# Patient Record
Sex: MALE | Race: WHITE | NOT HISPANIC OR LATINO
[De-identification: names, ages, dates, MRNs, and addresses within clinical notes are randomized per-mention and may not be internally consistent; named-entity substitution may affect disease eponyms.]

---

## 2017-02-07 PROBLEM — Z00.00 ENCOUNTER FOR PREVENTIVE HEALTH EXAMINATION: Status: ACTIVE | Noted: 2017-02-07

## 2017-02-21 ENCOUNTER — APPOINTMENT (OUTPATIENT)
Dept: DERMATOLOGY | Facility: CLINIC | Age: 37
End: 2017-02-21

## 2017-07-18 ENCOUNTER — APPOINTMENT (OUTPATIENT)
Dept: DERMATOLOGY | Facility: CLINIC | Age: 37
End: 2017-07-18

## 2017-11-13 ENCOUNTER — TRANSCRIPTION ENCOUNTER (OUTPATIENT)
Age: 37
End: 2017-11-13

## 2018-02-13 ENCOUNTER — APPOINTMENT (OUTPATIENT)
Dept: DERMATOLOGY | Facility: CLINIC | Age: 38
End: 2018-02-13
Payer: COMMERCIAL

## 2018-02-13 PROCEDURE — 99214 OFFICE O/P EST MOD 30 MIN: CPT

## 2019-02-12 ENCOUNTER — APPOINTMENT (OUTPATIENT)
Dept: DERMATOLOGY | Facility: CLINIC | Age: 39
End: 2019-02-12

## 2019-04-05 ENCOUNTER — APPOINTMENT (OUTPATIENT)
Dept: DERMATOLOGY | Facility: CLINIC | Age: 39
End: 2019-04-05
Payer: COMMERCIAL

## 2019-04-05 PROCEDURE — 99214 OFFICE O/P EST MOD 30 MIN: CPT

## 2023-01-05 ENCOUNTER — EMERGENCY (EMERGENCY)
Facility: HOSPITAL | Age: 43
LOS: 1 days | Discharge: HOME | End: 2023-01-05
Attending: STUDENT IN AN ORGANIZED HEALTH CARE EDUCATION/TRAINING PROGRAM
Payer: COMMERCIAL

## 2023-01-05 VITALS
TEMPERATURE: 98 F | RESPIRATION RATE: 18 BRPM | HEART RATE: 50 BPM | OXYGEN SATURATION: 95 % | SYSTOLIC BLOOD PRESSURE: 126 MMHG | DIASTOLIC BLOOD PRESSURE: 76 MMHG

## 2023-01-05 VITALS
DIASTOLIC BLOOD PRESSURE: 93 MMHG | SYSTOLIC BLOOD PRESSURE: 158 MMHG | HEART RATE: 63 BPM | RESPIRATION RATE: 16 BRPM | OXYGEN SATURATION: 97 % | TEMPERATURE: 98 F

## 2023-01-05 PROCEDURE — 73060 X-RAY EXAM OF HUMERUS: CPT | Mod: 26,RT

## 2023-01-05 PROCEDURE — 99284 EMERGENCY DEPT VISIT MOD MDM: CPT

## 2023-01-05 PROCEDURE — 73080 X-RAY EXAM OF ELBOW: CPT | Mod: 26,RT

## 2023-01-05 RX ORDER — OXYCODONE AND ACETAMINOPHEN 5; 325 MG/1; MG/1
1 TABLET ORAL ONCE
Refills: 0 | Status: DISCONTINUED | OUTPATIENT
Start: 2023-01-05 | End: 2023-01-06

## 2023-01-05 RX ORDER — IBUPROFEN 200 MG
600 TABLET ORAL ONCE
Refills: 0 | Status: COMPLETED | OUTPATIENT
Start: 2023-01-05 | End: 2023-01-05

## 2023-01-05 RX ADMIN — Medication 600 MILLIGRAM(S): at 21:37

## 2023-01-05 NOTE — ED PROVIDER NOTE - PATIENT PORTAL LINK FT
You can access the FollowMyHealth Patient Portal offered by [facility] by registering at the following website: [email or web address]. By joining [employer]’s FollowMyHealth portal, you will also be able to view your health information using other applications (apps) compatible with our system.

## 2023-01-05 NOTE — ED ADULT NURSE NOTE - OBJECTIVE STATEMENT
pt to ED with complaints of right bicep tear. pt states two weeks ago he was lifting a sandbag and felt a pop in his right bicep. pt states he then went to urgent care, had a xray. pt states the pain went away but today was jump roping and felt a pop in his right bicep. pt states his arm began to swell, he went to [facility] today and was sent here. right bicep noted to be swollen. pt able to extend and flex arm. pt states pain is 8/10 upon movement. pulses present and equal in bilateral arms. color of bilateral arms equal.

## 2023-01-05 NOTE — ED PROVIDER NOTE - CARE PROVIDERS DIRECT ADDRESSES
Normal vision: sees adequately in most situations; can see medication labels, newsprint
[email or web address]

## 2023-01-05 NOTE — ED PROVIDER NOTE - CLINICAL SUMMARY MEDICAL DECISION MAKING FREE TEXT BOX
41 yo male with no pmhx presents with rt humerus pain since this afternoon. Xrays of elbow and humerus performed- no visualized fx's or deformities. Pt with biceps tendon deformity on exam, hx and PE consistent with biceps tendon rupture. explained need for urgent f/u with ortho within 24-48 hrs. pain well controlled. no FND's, neurovascularly intact. strict return precautions explained.

## 2023-01-05 NOTE — ED PROVIDER NOTE - OBJECTIVE STATEMENT
41 yo male with no pmhx presents with rt humerus pain since this afternoon. Pt states that he was lifting a sand bag 2 wks ago and sustained a dull, achy pain in the rt humerus. Reports that he went to the [facility] and had xrays performed 2 wks ago, states was told no fractures. He states that he was working out today, jump roping, and felt a pop in the rt humerus and noticed that his humerus looked deformed. States that he was able to finish his workout. Denies shoulder pain or lower forearm pain. States he went back to [facility] and was told he had a biceps rupture.  Denies paresthesias in the extremities. Denies fever, chills, dizziness, LOC, vision changes, CP, palpitations, SOB, abd pain, N/V, rashes. Denies taking any meds at home for pain.

## 2023-01-05 NOTE — ED PROVIDER NOTE - NS ED ATTENDING STATEMENT MOD
This was a shared visit with the EVE. I reviewed and verified the documentation and independently performed the documented:

## 2023-01-05 NOTE — ED ADULT TRIAGE NOTE - CHIEF COMPLAINT QUOTE
Pt. sent from orthopedic office for surgery on right bicep for separation. Pt. states he injured it jump roping today.

## 2023-01-05 NOTE — ED PROVIDER NOTE - PHYSICAL EXAMINATION
Gen: No acute distress, non toxic  HEENT: Mucous membranes moist, pink conjunctivae, EOMI. Airway patent.   CV: RRR, nl s1/s2.  Resp: CTAB, normal rate and effort  Neuro: A&O x4, MAEx4. 5/5 str ext x 4. Sensation intact, symmetric throughout. No FNDs. Gait intact. CN 2-12 intact.   MSK: FROM UE b/l. compartment soft/compressible. +biceps tendon deformity.   Skin: +bulge at the distal biceps area. No rashes, skin intact and well perfused. cap refill <2sec  Vascular: Radial and ulnar pulses 2+ b/l.

## 2023-01-05 NOTE — ED PROVIDER NOTE - CARE PROVIDER_API CALL
Costa Denny)  Orthopaedic Surgery  [address] [place] [address]  Phone: [phone]  Fax: [phone]  Follow Up Time:

## 2023-01-05 NOTE — ED PROVIDER NOTE - NS ED ROS FT
Gen: denies fever, chills  Skin: denies rashes, laceration, bruising  HEENT: denies visual changes, ear pain, nasal congestion, throat pain  Respiratory: deniesSOB, cough  Cardiovascular: denies chest pain, palpitations  GI: denies abdominal pain, n/v  : denies dysuria, frequency, urgency, bowel/bladder incontinence  MSK: +rt upper arm pain. denies  back pain, neck pain  Neuro: denies headache, dizziness, LOC, weakness, numbness

## 2023-01-05 NOTE — ED PROVIDER NOTE - NSFOLLOWUPINSTRUCTIONS_ED_ALL_ED_FT
- Please follow-up with your primary care doctor in the next 1-2 days.  Please call tomorrow for an appointment.  If you cannot follow-up with your primary care doctor please return to the ED for any urgent issues.  - Follow up with the orthopedist within 1-2 days.   - You were given a copy of the tests performed today.  Please bring the results with you and review them with your primary care doctor.  - If you have any worsening of symptoms or any other concerns please return to the ED immediately.  - Please continue taking your home medications as directed.

## 2023-01-06 ENCOUNTER — APPOINTMENT (OUTPATIENT)
Dept: ORTHOPEDIC SURGERY | Facility: CLINIC | Age: 43
End: 2023-01-06
Payer: COMMERCIAL

## 2023-01-06 VITALS
SYSTOLIC BLOOD PRESSURE: 167 MMHG | HEART RATE: 65 BPM | DIASTOLIC BLOOD PRESSURE: 83 MMHG | BODY MASS INDEX: 28.88 KG/M2 | WEIGHT: 195 LBS | HEIGHT: 69 IN

## 2023-01-06 PROCEDURE — 73080 X-RAY EXAM OF ELBOW: CPT

## 2023-01-06 PROCEDURE — 99204 OFFICE O/P NEW MOD 45 MIN: CPT

## 2023-01-06 PROCEDURE — 99284 EMERGENCY DEPT VISIT MOD MDM: CPT

## 2023-01-06 PROCEDURE — 73060 X-RAY EXAM OF HUMERUS: CPT

## 2023-01-06 RX ORDER — MELOXICAM 15 MG/1
15 TABLET ORAL DAILY
Qty: 15 | Refills: 0 | Status: ACTIVE | COMMUNITY
Start: 2023-01-06 | End: 1900-01-01

## 2023-01-06 RX ADMIN — OXYCODONE AND ACETAMINOPHEN 1 TABLET(S): 5; 325 TABLET ORAL at 00:31

## 2023-01-06 NOTE — HISTORY OF PRESENT ILLNESS
[FreeTextEntry1] : Timmy is a pleasant 42-year-old male who 2 weeks prior to today's visit was lifting heavy sand back at the gym and unfortunately suffered a sudden pop and pain with subsequent deformity of his biceps.  He now has discomfort at the shoulder and distally at the elbow

## 2023-01-06 NOTE — ASSESSMENT
[FreeTextEntry1] : ASSESSMENT:\par \par The patient comes in today with with an acute traumatic rupture of his right elbow from its proximal origin site.  He now has distal migration of the muscle belly and severe weakness and deformity.  He is a very active individual.  This even happened to him while at the gym.  At this point he is seeking surgical repair of his biceps.  We talked about issues such as chronic deformity and pain.  At this point the only way of restoring his biceps contour would be with tenodesis of his biceps in an open fashion.  He understands that this will not be an anatomic reconstruction.  The biceps does restore its anatomic contour but through a tenodesis at the humerus in the arm.\par \par He was adequately and thoroughly informed of my assessment of their current condition(s). \par \par Surgical Consent Discussion:\par \par I explained the risks and benefits of surgical intervention to the patient. The risks include, but are not limited to: pain, infection, swelling, stiffness, injury to underlying neurovascular structures, scar sensitivity, incomplete resolution of symptoms, the possibility of the need for future surgery and finally the need to comply with a post-operative occupational therapy protocol. The patient understands, agrees and informed consent was obtained. The patient’s surgery will be scheduled in the near future.\par \par \par

## 2023-01-06 NOTE — PHYSICAL EXAM
[de-identified] : He is well-appearing on examination\par \par Examination of his right arm shows deformity of his biceps muscle with distal migration of the muscle belly.  There is severe pain with proximal compression at the biceps muscle itself as well as into the shoulder.  Hook testing at the elbow reveals an intact tendon distally.  He has severe pain with resisted supination and weakness as well as pain and weakness with flexion of the elbow. [de-identified] : I reviewed recent imaging ordered by a separate provider of his right elbow which is grossly unremarkable

## 2023-01-07 ENCOUNTER — OUTPATIENT (OUTPATIENT)
Dept: OUTPATIENT SERVICES | Facility: HOSPITAL | Age: 43
LOS: 1 days | End: 2023-01-07
Payer: COMMERCIAL

## 2023-01-07 DIAGNOSIS — Z01.818 ENCOUNTER FOR OTHER PREPROCEDURAL EXAMINATION: ICD-10-CM

## 2023-01-07 LAB
A1C WITH ESTIMATED AVERAGE GLUCOSE RESULT: 5.2 % — SIGNIFICANT CHANGE UP (ref 4–5.6)
ANION GAP SERPL CALC-SCNC: 10 MMOL/L — SIGNIFICANT CHANGE UP (ref 5–17)
BUN SERPL-MCNC: 16.8 MG/DL — SIGNIFICANT CHANGE UP (ref 8–20)
CALCIUM SERPL-MCNC: 9.7 MG/DL — SIGNIFICANT CHANGE UP (ref 8.4–10.5)
CHLORIDE SERPL-SCNC: 100 MMOL/L — SIGNIFICANT CHANGE UP (ref 96–108)
CO2 SERPL-SCNC: 27 MMOL/L — SIGNIFICANT CHANGE UP (ref 22–29)
CREAT SERPL-MCNC: 0.9 MG/DL — SIGNIFICANT CHANGE UP (ref 0.5–1.3)
EGFR: 111 ML/MIN/1.73M2 — SIGNIFICANT CHANGE UP
ESTIMATED AVERAGE GLUCOSE: 103 MG/DL — SIGNIFICANT CHANGE UP (ref 68–114)
GLUCOSE SERPL-MCNC: 92 MG/DL — SIGNIFICANT CHANGE UP (ref 70–99)
HCT VFR BLD CALC: 46.6 % — SIGNIFICANT CHANGE UP (ref 39–50)
HGB BLD-MCNC: 15.8 G/DL — SIGNIFICANT CHANGE UP (ref 13–17)
MCHC RBC-ENTMCNC: 31 PG — SIGNIFICANT CHANGE UP (ref 27–34)
MCHC RBC-ENTMCNC: 33.9 GM/DL — SIGNIFICANT CHANGE UP (ref 32–36)
MCV RBC AUTO: 91.6 FL — SIGNIFICANT CHANGE UP (ref 80–100)
PLATELET # BLD AUTO: 307 K/UL — SIGNIFICANT CHANGE UP (ref 150–400)
POTASSIUM SERPL-MCNC: 5 MMOL/L — SIGNIFICANT CHANGE UP (ref 3.5–5.3)
POTASSIUM SERPL-SCNC: 5 MMOL/L — SIGNIFICANT CHANGE UP (ref 3.5–5.3)
RBC # BLD: 5.09 M/UL — SIGNIFICANT CHANGE UP (ref 4.2–5.8)
RBC # FLD: 12.2 % — SIGNIFICANT CHANGE UP (ref 10.3–14.5)
SARS-COV-2 RNA SPEC QL NAA+PROBE: SIGNIFICANT CHANGE UP
SODIUM SERPL-SCNC: 137 MMOL/L — SIGNIFICANT CHANGE UP (ref 135–145)
WBC # BLD: 6.57 K/UL — SIGNIFICANT CHANGE UP (ref 3.8–10.5)
WBC # FLD AUTO: 6.57 K/UL — SIGNIFICANT CHANGE UP (ref 3.8–10.5)

## 2023-01-07 PROCEDURE — U0005: CPT

## 2023-01-07 PROCEDURE — G0463: CPT

## 2023-01-07 PROCEDURE — U0003: CPT

## 2023-01-07 PROCEDURE — 83036 HEMOGLOBIN GLYCOSYLATED A1C: CPT

## 2023-01-07 PROCEDURE — 80048 BASIC METABOLIC PNL TOTAL CA: CPT

## 2023-01-07 PROCEDURE — 36415 COLL VENOUS BLD VENIPUNCTURE: CPT

## 2023-01-07 PROCEDURE — 85027 COMPLETE CBC AUTOMATED: CPT

## 2023-01-08 ENCOUNTER — TRANSCRIPTION ENCOUNTER (OUTPATIENT)
Age: 43
End: 2023-01-08

## 2023-01-09 ENCOUNTER — TRANSCRIPTION ENCOUNTER (OUTPATIENT)
Age: 43
End: 2023-01-09

## 2023-01-09 PROBLEM — Z78.9 OTHER SPECIFIED HEALTH STATUS: Chronic | Status: ACTIVE | Noted: 2023-01-05

## 2023-01-09 RX ORDER — OXYCODONE 5 MG/1
5 TABLET ORAL EVERY 6 HOURS
Qty: 24 | Refills: 0 | Status: ACTIVE | COMMUNITY
Start: 2023-01-09 | End: 1900-01-01

## 2023-01-10 ENCOUNTER — APPOINTMENT (OUTPATIENT)
Dept: ORTHOPEDIC SURGERY | Facility: AMBULATORY SURGERY CENTER | Age: 43
End: 2023-01-10
Payer: COMMERCIAL

## 2023-01-10 PROCEDURE — 23430 REPAIR BICEPS TENDON: CPT | Mod: RT

## 2023-01-10 PROCEDURE — 64708 REVISE ARM/LEG NERVE: CPT | Mod: RT

## 2023-01-19 ENCOUNTER — TRANSCRIPTION ENCOUNTER (OUTPATIENT)
Age: 43
End: 2023-01-19

## 2023-01-26 ENCOUNTER — APPOINTMENT (OUTPATIENT)
Dept: ORTHOPEDIC SURGERY | Facility: CLINIC | Age: 43
End: 2023-01-26
Payer: COMMERCIAL

## 2023-01-26 VITALS
WEIGHT: 195 LBS | HEIGHT: 69 IN | SYSTOLIC BLOOD PRESSURE: 139 MMHG | DIASTOLIC BLOOD PRESSURE: 86 MMHG | BODY MASS INDEX: 28.88 KG/M2 | HEART RATE: 66 BPM

## 2023-01-26 PROCEDURE — 99024 POSTOP FOLLOW-UP VISIT: CPT

## 2023-01-26 NOTE — HISTORY OF PRESENT ILLNESS
[de-identified] : s/p Right open proximal bicep tendonitis .\par Right shoulder musculocutaneous neurolysis. DOS:1/10/23\par  [de-identified] : Timmy returns for follow-up.  He denies pain [de-identified] : He is well-appearing on examination\par Examination of the incision of the right arm reveals excellent healing with minimal swelling and no signs of infection.  He is neurovascularly intact distally.  He has excellent biceps contour [de-identified] : I am delighted to see that Timmy is doing so well and so is he.  At this point in time it is prudent for him to commence physical therapy to progress with motion and later on strengthening [de-identified] : 1.  I would like to see him back in 4 weeks time for a clinical assessment

## 2023-03-02 ENCOUNTER — APPOINTMENT (OUTPATIENT)
Dept: ORTHOPEDIC SURGERY | Facility: CLINIC | Age: 43
End: 2023-03-02
Payer: COMMERCIAL

## 2023-03-02 VITALS
DIASTOLIC BLOOD PRESSURE: 82 MMHG | HEIGHT: 69 IN | HEART RATE: 54 BPM | WEIGHT: 195 LBS | BODY MASS INDEX: 28.88 KG/M2 | SYSTOLIC BLOOD PRESSURE: 147 MMHG

## 2023-03-02 DIAGNOSIS — M25.521 PAIN IN RIGHT ELBOW: ICD-10-CM

## 2023-03-02 PROCEDURE — 99024 POSTOP FOLLOW-UP VISIT: CPT

## 2023-03-02 NOTE — HISTORY OF PRESENT ILLNESS
[de-identified] : s/p Right open proximal bicep tendonitis .\par Right shoulder musculocutaneous neurolysis. DOS:1/10/23\par  [de-identified] : Timmy returns for follow-up.  He denies pain.  He has been working with physical therapy and is transitioning out of the brace entirely.  He is currently at 5 pounds. [de-identified] : He is well-appearing on examination\par Examination of the incision of the right arm reveals excellent healing with no swelling and no signs of infection.  He is neurovascularly intact distally.  He has excellent biceps contour,.\par He is able to fully extend and flex the elbow actively [de-identified] : I am delighted to see that Timmy is doing so well and so is he.  At this point in time it is prudent for him to continue physical therapy to progress with motion and later on strengthening.  We discussed strengthening after 10 to 12 weeks.  Sports will be at the 4-month karen and after. [de-identified] : 1.  I would like to see him back in 8 weeks for a clinical assessment

## 2023-05-04 ENCOUNTER — APPOINTMENT (OUTPATIENT)
Dept: ORTHOPEDIC SURGERY | Facility: CLINIC | Age: 43
End: 2023-05-04
Payer: COMMERCIAL

## 2023-05-04 VITALS
DIASTOLIC BLOOD PRESSURE: 91 MMHG | SYSTOLIC BLOOD PRESSURE: 151 MMHG | HEIGHT: 69 IN | BODY MASS INDEX: 28.88 KG/M2 | WEIGHT: 195 LBS | HEART RATE: 65 BPM

## 2023-05-04 DIAGNOSIS — M25.511 PAIN IN RIGHT SHOULDER: ICD-10-CM

## 2023-05-04 PROCEDURE — 20610 DRAIN/INJ JOINT/BURSA W/O US: CPT

## 2023-05-04 PROCEDURE — 99214 OFFICE O/P EST MOD 30 MIN: CPT | Mod: 25

## 2023-05-04 NOTE — REASON FOR VISIT
[Follow-Up Visit] : a follow-up visit for [Other: ____] : [unfilled] [FreeTextEntry2] : Right open proximal bicep tendonitis.\par Right shoulder musculocutaneous neurolysis. DOS:1/10/23\par

## 2023-05-04 NOTE — PHYSICAL EXAM
[de-identified] : He is well-appearing on examination\par Examination of the [right] shoulder reveals equal active and passive motion as compared to the contralateral side\par There is a positive Speed, positive Jolie, positive Dias, tenderness with anterior shoulder compression\par \par Examination of the right arm anteriorly has an incision that has healed tremendously.  He has excellent biceps contour.

## 2023-05-04 NOTE — HISTORY OF PRESENT ILLNESS
[FreeTextEntry1] : Timmy returns for follow-up.  He is status post right proximal biceps repair for which she is doing well.  He has progressed tremendously with physical therapy however despite therapy he continues to complain of right shoulder pain.  At this point in time he would like to rediscuss the prior shoulder MRI and consider potential surgical management if needed.

## 2023-05-04 NOTE — ASSESSMENT
[FreeTextEntry1] : ASSESSMENT:\par \par The patient comes in today with status post right shoulder and arm injury with proximal biceps tear.  He is now status post right proximal biceps subpectoral fixation for which she has done well especially after therapy for the arm and shoulder.  However, his right shoulder pain symptoms continue to worsen despite all the above.  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  I do believe that he has symptoms consistent with labral pathology likely secondary to his biceps rupture as well.\par Today he would like to trial an injection.  If no improvement we did discuss arthroscopic management as he has failed therapy as well\par [I have diagnosed the patient today with a new diagnosis - This may diminish bodily function for the extremity.] \par \par [For this I was able to review other physician’s note(s) including reviewing other tests, imaging results as well as personally view these results for my own interpretation.]\par \par Right SUBACROMIAL SHOULDER INJECTION\par \par Indication:  subacromial bursitis/impingement, pain\par \par Risk, benefits and alternatives were discussed with the patient. Potential complications include bleeding and infection. \par Alcohol was used to prep the area.  Ethyl chloride spray was used as a topical anesthetic.  Using sterile technique, the injection needle was then directed from a standard posterior approach parallel to and inferior to the acromion.\par A 21g needle was used to inject 5 mL of 1% Lidocaine and 2 mL Kenalog.  No significant resistance was encountered.   \par A bandage was applied.  The patient tolerated the procedure well.   \par \par Patient instructed to avoid strenuous activity for 2 day(s). \par Specifically counseled regarding the signs and symptoms of potential infection and instructed to present promptly to clinic or hospital if such signs and symptoms arise.\par \par The patient was adequately and thoroughly informed of my assessment of their current condition(s). \par \par DISCUSSION:\par 1.  I am hopeful that the injection to the right shoulder will help relieve his current symptoms of impingement and bursitis.  He will continue with his home exercise program.  I have placed a prescription for physical therapy.\par 2.  I will see him again in 6 weeks time if not sooner.  If symptoms have not improved we will proceed with management arthroscopically.\par

## 2023-06-16 ENCOUNTER — APPOINTMENT (OUTPATIENT)
Dept: ORTHOPEDIC SURGERY | Facility: CLINIC | Age: 43
End: 2023-06-16